# Patient Record
Sex: MALE | Race: WHITE | NOT HISPANIC OR LATINO | Employment: UNEMPLOYED | URBAN - METROPOLITAN AREA
[De-identification: names, ages, dates, MRNs, and addresses within clinical notes are randomized per-mention and may not be internally consistent; named-entity substitution may affect disease eponyms.]

---

## 2024-07-25 ENCOUNTER — HOSPITAL ENCOUNTER (INPATIENT)
Facility: CLINIC | Age: 69
LOS: 2 days | Discharge: HOME | DRG: 897 | End: 2024-07-27
Attending: FAMILY MEDICINE | Admitting: PSYCHIATRY & NEUROLOGY
Payer: COMMERCIAL

## 2024-07-25 ENCOUNTER — TELEPHONE (OUTPATIENT)
Dept: BEHAVIORAL HEALTH | Facility: CLINIC | Age: 69
End: 2024-07-25

## 2024-07-25 DIAGNOSIS — F10.239 ALCOHOL DEPENDENCE WITH WITHDRAWAL WITH COMPLICATION (H): ICD-10-CM

## 2024-07-25 LAB
ALBUMIN SERPL BCG-MCNC: 4.6 G/DL (ref 3.5–5.2)
ALBUMIN UR-MCNC: NEGATIVE MG/DL
ALP SERPL-CCNC: 84 U/L (ref 40–150)
ALT SERPL W P-5'-P-CCNC: 31 U/L (ref 0–70)
AMPHETAMINES UR QL SCN: NORMAL
ANION GAP SERPL CALCULATED.3IONS-SCNC: 13 MMOL/L (ref 7–15)
APPEARANCE UR: CLEAR
AST SERPL W P-5'-P-CCNC: 21 U/L (ref 0–45)
BARBITURATES UR QL SCN: NORMAL
BASOPHILS # BLD AUTO: 0 10E3/UL (ref 0–0.2)
BASOPHILS NFR BLD AUTO: 1 %
BENZODIAZ UR QL SCN: NORMAL
BILIRUB SERPL-MCNC: 0.5 MG/DL
BILIRUB UR QL STRIP: NEGATIVE
BUN SERPL-MCNC: 10 MG/DL (ref 8–23)
BZE UR QL SCN: NORMAL
CALCIUM SERPL-MCNC: 9.6 MG/DL (ref 8.8–10.4)
CANNABINOIDS UR QL SCN: NORMAL
CHLORIDE SERPL-SCNC: 94 MMOL/L (ref 98–107)
COLOR UR AUTO: NORMAL
CREAT SERPL-MCNC: 0.88 MG/DL (ref 0.67–1.17)
EGFRCR SERPLBLD CKD-EPI 2021: >90 ML/MIN/1.73M2
EOSINOPHIL # BLD AUTO: 0.4 10E3/UL (ref 0–0.7)
EOSINOPHIL NFR BLD AUTO: 5 %
ERYTHROCYTE [DISTWIDTH] IN BLOOD BY AUTOMATED COUNT: 12.2 % (ref 10–15)
ETHANOL SERPL-MCNC: 0.01 G/DL
FENTANYL UR QL: NORMAL
GLUCOSE SERPL-MCNC: 88 MG/DL (ref 70–99)
GLUCOSE UR STRIP-MCNC: NEGATIVE MG/DL
HCO3 SERPL-SCNC: 23 MMOL/L (ref 22–29)
HCT VFR BLD AUTO: 41.3 % (ref 40–53)
HGB BLD-MCNC: 14.8 G/DL (ref 13.3–17.7)
HGB UR QL STRIP: NEGATIVE
HOLD SPECIMEN: NORMAL
HOLD SPECIMEN: NORMAL
IMM GRANULOCYTES # BLD: 0.1 10E3/UL
IMM GRANULOCYTES NFR BLD: 1 %
KETONES UR STRIP-MCNC: NEGATIVE MG/DL
LEUKOCYTE ESTERASE UR QL STRIP: NEGATIVE
LIPASE SERPL-CCNC: 48 U/L (ref 13–60)
LYMPHOCYTES # BLD AUTO: 2.6 10E3/UL (ref 0.8–5.3)
LYMPHOCYTES NFR BLD AUTO: 30 %
MAGNESIUM SERPL-MCNC: 2 MG/DL (ref 1.7–2.3)
MCH RBC QN AUTO: 32.4 PG (ref 26.5–33)
MCHC RBC AUTO-ENTMCNC: 35.8 G/DL (ref 31.5–36.5)
MCV RBC AUTO: 90 FL (ref 78–100)
MONOCYTES # BLD AUTO: 0.8 10E3/UL (ref 0–1.3)
MONOCYTES NFR BLD AUTO: 9 %
NEUTROPHILS # BLD AUTO: 4.8 10E3/UL (ref 1.6–8.3)
NEUTROPHILS NFR BLD AUTO: 54 %
NITRATE UR QL: NEGATIVE
NRBC # BLD AUTO: 0 10E3/UL
NRBC BLD AUTO-RTO: 0 /100
OPIATES UR QL SCN: NORMAL
PCP QUAL URINE (ROCHE): NORMAL
PH UR STRIP: 6 [PH] (ref 5–7)
PLATELET # BLD AUTO: 209 10E3/UL (ref 150–450)
POTASSIUM SERPL-SCNC: 4.2 MMOL/L (ref 3.4–5.3)
PROT SERPL-MCNC: 7.2 G/DL (ref 6.4–8.3)
RBC # BLD AUTO: 4.57 10E6/UL (ref 4.4–5.9)
RBC URINE: 0 /HPF
SODIUM SERPL-SCNC: 130 MMOL/L (ref 135–145)
SP GR UR STRIP: 1 (ref 1–1.03)
UROBILINOGEN UR STRIP-MCNC: NORMAL MG/DL
WBC # BLD AUTO: 8.7 10E3/UL (ref 4–11)
WBC URINE: 0 /HPF

## 2024-07-25 PROCEDURE — 250N000013 HC RX MED GY IP 250 OP 250 PS 637: Performed by: CLINICAL NURSE SPECIALIST

## 2024-07-25 PROCEDURE — 83036 HEMOGLOBIN GLYCOSYLATED A1C: CPT | Performed by: PSYCHIATRY & NEUROLOGY

## 2024-07-25 PROCEDURE — 128N000004 HC R&B CD ADULT

## 2024-07-25 PROCEDURE — 36415 COLL VENOUS BLD VENIPUNCTURE: CPT | Performed by: FAMILY MEDICINE

## 2024-07-25 PROCEDURE — 84443 ASSAY THYROID STIM HORMONE: CPT | Performed by: PSYCHIATRY & NEUROLOGY

## 2024-07-25 PROCEDURE — 82977 ASSAY OF GGT: CPT | Performed by: PSYCHIATRY & NEUROLOGY

## 2024-07-25 PROCEDURE — 85025 COMPLETE CBC W/AUTO DIFF WBC: CPT | Performed by: FAMILY MEDICINE

## 2024-07-25 PROCEDURE — 99285 EMERGENCY DEPT VISIT HI MDM: CPT | Performed by: FAMILY MEDICINE

## 2024-07-25 PROCEDURE — HZ2ZZZZ DETOXIFICATION SERVICES FOR SUBSTANCE ABUSE TREATMENT: ICD-10-PCS | Performed by: PSYCHIATRY & NEUROLOGY

## 2024-07-25 PROCEDURE — 80307 DRUG TEST PRSMV CHEM ANLYZR: CPT | Performed by: FAMILY MEDICINE

## 2024-07-25 PROCEDURE — 80053 COMPREHEN METABOLIC PANEL: CPT | Performed by: FAMILY MEDICINE

## 2024-07-25 PROCEDURE — 82077 ASSAY SPEC XCP UR&BREATH IA: CPT | Performed by: FAMILY MEDICINE

## 2024-07-25 PROCEDURE — 81001 URINALYSIS AUTO W/SCOPE: CPT | Performed by: FAMILY MEDICINE

## 2024-07-25 PROCEDURE — 83735 ASSAY OF MAGNESIUM: CPT | Performed by: FAMILY MEDICINE

## 2024-07-25 PROCEDURE — 83690 ASSAY OF LIPASE: CPT | Performed by: FAMILY MEDICINE

## 2024-07-25 PROCEDURE — 258N000003 HC RX IP 258 OP 636: Performed by: FAMILY MEDICINE

## 2024-07-25 PROCEDURE — 250N000013 HC RX MED GY IP 250 OP 250 PS 637: Performed by: FAMILY MEDICINE

## 2024-07-25 RX ORDER — DIAZEPAM 5 MG
5-20 TABLET ORAL EVERY 30 MIN PRN
Status: DISCONTINUED | OUTPATIENT
Start: 2024-07-25 | End: 2024-07-25

## 2024-07-25 RX ORDER — LOPERAMIDE HCL 2 MG
2 CAPSULE ORAL 4 TIMES DAILY PRN
Status: DISCONTINUED | OUTPATIENT
Start: 2024-07-25 | End: 2024-07-27 | Stop reason: HOSPADM

## 2024-07-25 RX ORDER — POLYETHYLENE GLYCOL 3350 17 G/17G
17 POWDER, FOR SOLUTION ORAL DAILY PRN
Status: DISCONTINUED | OUTPATIENT
Start: 2024-07-25 | End: 2024-07-27 | Stop reason: HOSPADM

## 2024-07-25 RX ORDER — ONDANSETRON 4 MG/1
4 TABLET, ORALLY DISINTEGRATING ORAL EVERY 6 HOURS PRN
Status: DISCONTINUED | OUTPATIENT
Start: 2024-07-25 | End: 2024-07-27 | Stop reason: HOSPADM

## 2024-07-25 RX ORDER — ATORVASTATIN CALCIUM 10 MG/1
10 TABLET, FILM COATED ORAL DAILY
COMMUNITY
Start: 2024-04-19

## 2024-07-25 RX ORDER — GABAPENTIN 100 MG/1
100 CAPSULE ORAL EVERY 6 HOURS PRN
Status: DISCONTINUED | OUTPATIENT
Start: 2024-07-25 | End: 2024-07-27 | Stop reason: HOSPADM

## 2024-07-25 RX ORDER — ACETAMINOPHEN 500 MG
1000 TABLET ORAL EVERY 6 HOURS PRN
COMMUNITY

## 2024-07-25 RX ORDER — MAGNESIUM HYDROXIDE/ALUMINUM HYDROXICE/SIMETHICONE 120; 1200; 1200 MG/30ML; MG/30ML; MG/30ML
30 SUSPENSION ORAL EVERY 4 HOURS PRN
Status: DISCONTINUED | OUTPATIENT
Start: 2024-07-25 | End: 2024-07-27 | Stop reason: HOSPADM

## 2024-07-25 RX ORDER — ASPIRIN 81 MG/1
81 TABLET ORAL DAILY
COMMUNITY

## 2024-07-25 RX ORDER — ACETAMINOPHEN 325 MG/1
650 TABLET ORAL EVERY 4 HOURS PRN
Status: DISCONTINUED | OUTPATIENT
Start: 2024-07-25 | End: 2024-07-27 | Stop reason: HOSPADM

## 2024-07-25 RX ORDER — TRAZODONE HYDROCHLORIDE 50 MG/1
50 TABLET, FILM COATED ORAL
Status: DISCONTINUED | OUTPATIENT
Start: 2024-07-25 | End: 2024-07-27 | Stop reason: HOSPADM

## 2024-07-25 RX ORDER — ALBUTEROL SULFATE 90 UG/1
2 AEROSOL, METERED RESPIRATORY (INHALATION) EVERY 6 HOURS PRN
COMMUNITY

## 2024-07-25 RX ORDER — DIAZEPAM 5 MG
5-20 TABLET ORAL EVERY 30 MIN PRN
Status: DISCONTINUED | OUTPATIENT
Start: 2024-07-25 | End: 2024-07-27 | Stop reason: HOSPADM

## 2024-07-25 RX ORDER — AMLODIPINE BESYLATE 10 MG/1
1 TABLET ORAL DAILY
COMMUNITY
Start: 2024-04-19

## 2024-07-25 RX ADMIN — DIAZEPAM 10 MG: 5 TABLET ORAL at 21:48

## 2024-07-25 RX ADMIN — SODIUM CHLORIDE 1000 ML: 0.9 INJECTION, SOLUTION INTRAVENOUS at 19:12

## 2024-07-25 RX ADMIN — GABAPENTIN 100 MG: 100 CAPSULE ORAL at 21:48

## 2024-07-25 RX ADMIN — DIAZEPAM 10 MG: 5 TABLET ORAL at 17:00

## 2024-07-25 ASSESSMENT — COLUMBIA-SUICIDE SEVERITY RATING SCALE - C-SSRS
5. HAVE YOU STARTED TO WORK OUT OR WORKED OUT THE DETAILS OF HOW TO KILL YOURSELF? DO YOU INTEND TO CARRY OUT THIS PLAN?: NO
1. IN THE PAST MONTH, HAVE YOU WISHED YOU WERE DEAD OR WISHED YOU COULD GO TO SLEEP AND NOT WAKE UP?: NO
3. HAVE YOU BEEN THINKING ABOUT HOW YOU MIGHT KILL YOURSELF?: NO
4. HAVE YOU HAD THESE THOUGHTS AND HAD SOME INTENTION OF ACTING ON THEM?: NO
2. HAVE YOU ACTUALLY HAD ANY THOUGHTS OF KILLING YOURSELF IN THE PAST MONTH?: YES
6. HAVE YOU EVER DONE ANYTHING, STARTED TO DO ANYTHING, OR PREPARED TO DO ANYTHING TO END YOUR LIFE?: NO

## 2024-07-25 ASSESSMENT — ACTIVITIES OF DAILY LIVING (ADL)
ADLS_ACUITY_SCORE: 35
HYGIENE/GROOMING: INDEPENDENT
ADLS_ACUITY_SCORE: 30
ADLS_ACUITY_SCORE: 35
DRESS: INDEPENDENT
ADLS_ACUITY_SCORE: 30
ORAL_HYGIENE: INDEPENDENT
LAUNDRY: WITH SUPERVISION
ADLS_ACUITY_SCORE: 35

## 2024-07-25 NOTE — TELEPHONE ENCOUNTER
"S: [facility] , Provider Christianson calling at 6:06 PM  with clinical on a 68 year old/Male presenting for alcohol detox.     B: Pt presents for ETOH detox. 69 yo drinking 12-18 beers with whiskey for several months. Last sober a yr ago. No MH concerns . Wakes from \"dream\" but no DT's. Pt scored a 13 has had valium in ED and is doing well. Ambulating and wants detox.      Currently reports drinking 12-18 beers and whiskey a day for several months.    Patient reports last use was Yesterday.  Pt ROMÁN: 0.01  Pt  denies hx of DT  Pt  denies hx of seizures. Last seizure: N/A  Pt endorsing the following symptoms of withdrawal: Tremulous  MSSA Score: 13, protocol started pt given valium.    Pt denies acute mental health or medical concerns.   Pt denies other drug use: None Amount/frequency: N/A    Does Pt have a detox care plan in [facility]? No  Does pt present with specific needs, assistive devices, or exclusionary criteria? None  Is the patient ambulating, eating and drinking in the ED? Yes    A: Pt meets criteria to be presented for IP detox admission. Patient is voluntary    COVID Symptoms: No  If yes, COVID test required   Utox: Ordered, not yet collected  Magnesium: WNL  CMP: Abnormalities: See labs  CBC: WNL  HCG: N/A     R: Patient cleared and ready for behavioral bed placement: Yes    Pt is meeting criteria for presentation to 3A/CD    Does Patient need a Transfer Center request created? No, Pt is located within [facility] ED, [facility] ED, or [place] [facility]     6:12 PM  Intake paged provider Reny to review pt for 3A/CD/Charlene.  "

## 2024-07-25 NOTE — TELEPHONE ENCOUNTER
6:28 PM  Provider Reny has accepted pt for 3A/AGATHA/Charlene with a request to Dr. Alejandre to kindly order NaCl bolus for pt.       Intake has called Station 3A ANITHA Griffith and ED nurse Ha  to notify. Per Gladis, ED should kindly wait for 3A to call them for report. RN Ha is aware and has to start IV for pt's saline and will wait to hear from the unit. Author is happy that everyone is on the same page.    The pt. has been added to the queue, the admit board and all transfer items (Guarantor, indicia, bed planning,  Internal checklist) complete.

## 2024-07-25 NOTE — ED PROVIDER NOTES
"    [facility] EMERGENCY DEPARTMENT ([facility])  7/25/24  [place]      History     Chief Complaint   Patient presents with    Drug / Alcohol Assessment     Patient presents seeking detox from alcohol; last drink 1 hour prior to arrival. Patient is currently drinking 12-16 beers per day. Patient denies history of seizures. No street drugs. Patient smokes 1 pack per day.     HPI  Mario Garcia is a 68 year old male with a past medical history significant for alcohol use disorder who presents to the Emergency Department seeking detox.  Patient accompanied here by significant other who states patient has been drinking on a daily basis he admits to 12-16 beers a day plus whiskey he denies any history of seizures no history of DTs patient is a pack-a-day smoker.  He denies any other acute medical concerns no acute psychiatric concerns denying homicidal suicidal or self-injurious behaviors patient is interested in both detox and the lodging plus program.      Past Medical History  No past medical history on file.  No past surgical history on file.  acetaminophen (TYLENOL) 500 MG tablet  amLODIPine (NORVASC) 10 MG tablet  aspirin 81 MG EC tablet  atorvastatin (LIPITOR) 10 MG tablet  omeprazole (PRILOSEC) 20 MG DR capsule      Allergies   Allergen Reactions    Bee Venom      Family History  No family history on file.  Social History       Past medical history, past surgical history, medications, allergies, family history, and social history were reviewed with the patient. No additional pertinent items.   A complete review of systems was performed with pertinent positives and negatives noted in the HPI, and all other systems negative.    Physical Exam   BP: 135/76  Pulse: 57  Temp: 98.1  F (36.7  C)  Resp: 16  Height: 177.8 cm (5' 10\")  Weight: 94.8 kg (209 lb)  SpO2: 98 %  Physical Exam  Constitutional:       General: He is not in acute distress.     Appearance: Normal appearance. He is not toxic-appearing.   HENT: "      Head: Atraumatic.   Eyes:      General: No scleral icterus.     Conjunctiva/sclera: Conjunctivae normal.   Cardiovascular:      Rate and Rhythm: Normal rate.      Heart sounds: Normal heart sounds.   Pulmonary:      Effort: Pulmonary effort is normal. No respiratory distress.      Breath sounds: Normal breath sounds.   Abdominal:      Palpations: Abdomen is soft.      Tenderness: There is no abdominal tenderness.   Musculoskeletal:         General: No deformity.      Cervical back: Neck supple.   Skin:     General: Skin is warm.   Neurological:      General: No focal deficit present.      Mental Status: He is alert and oriented to person, place, and time.      Sensory: No sensory deficit.      Motor: No weakness.      Coordination: Coordination normal.   Psychiatric:         Mood and Affect: Mood is anxious.         Speech: Speech normal.         Behavior: Behavior is cooperative.         Thought Content: Thought content does not include homicidal or suicidal ideation.           ED Course, Procedures, & Data      Procedures     Results for orders placed or performed during the hospital encounter of 07/25/24   [place] Draw     Status: None    Narrative    The following orders were created for panel order [place] Draw.  Procedure                               Abnormality         Status                     ---------                               -----------         ------                     Extra Green Top (Lithium...[[id number]]                      Final result               Extra Purple Top Tube[[id number]]                            Final result                 Please view results for these tests on the individual orders.   UA with Microscopic     Status: Normal   Result Value Ref Range    Color Urine Straw Colorless, Straw, Light Yellow, Yellow    Appearance Urine Clear Clear    Glucose Urine Negative Negative mg/dL    Bilirubin Urine Negative Negative    Ketones Urine Negative Negative mg/dL    Specific  Gravity Urine 1.003 1.003 - 1.035    Blood Urine Negative Negative    pH Urine 6.0 5.0 - 7.0    Protein Albumin Urine Negative Negative mg/dL    Urobilinogen Urine Normal Normal, 2.0 mg/dL    Nitrite Urine Negative Negative    Leukocyte Esterase Urine Negative Negative    RBC Urine 0 <=2 /HPF    WBC Urine 0 <=5 /HPF   Extra Green Top (Lithium Heparin) Tube     Status: None   Result Value Ref Range    Hold Specimen JI    Extra Purple Top Tube     Status: None   Result Value Ref Range    Hold Specimen [facility] hospital    Comprehensive metabolic panel     Status: Abnormal   Result Value Ref Range    Sodium 130 (L) 135 - 145 mmol/L    Potassium 4.2 3.4 - 5.3 mmol/L    Carbon Dioxide (CO2) 23 22 - 29 mmol/L    Anion Gap 13 7 - 15 mmol/L    Urea Nitrogen 10.0 8.0 - 23.0 mg/dL    Creatinine 0.88 0.67 - 1.17 mg/dL    GFR Estimate >90 >60 mL/min/1.73m2    Calcium 9.6 8.8 - 10.4 mg/dL    Chloride 94 (L) 98 - 107 mmol/L    Glucose 88 70 - 99 mg/dL    Alkaline Phosphatase 84 40 - 150 U/L    AST 21 0 - 45 U/L    ALT 31 0 - 70 U/L    Protein Total 7.2 6.4 - 8.3 g/dL    Albumin 4.6 3.5 - 5.2 g/dL    Bilirubin Total 0.5 <=1.2 mg/dL   Lipase     Status: Normal   Result Value Ref Range    Lipase 48 13 - 60 U/L   Magnesium     Status: Normal   Result Value Ref Range    Magnesium 2.0 1.7 - 2.3 mg/dL   Ethyl Alcohol Level     Status: Normal   Result Value Ref Range    Alcohol ethyl 0.01 <=0.01 g/dL   CBC with platelets and differential     Status: None   Result Value Ref Range    WBC Count 8.7 4.0 - 11.0 10e3/uL    RBC Count 4.57 4.40 - 5.90 10e6/uL    Hemoglobin 14.8 13.3 - 17.7 g/dL    Hematocrit 41.3 40.0 - 53.0 %    MCV 90 78 - 100 fL    MCH 32.4 26.5 - 33.0 pg    MCHC 35.8 31.5 - 36.5 g/dL    RDW 12.2 10.0 - 15.0 %    Platelet Count 209 150 - 450 10e3/uL    % Neutrophils 54 %    % Lymphocytes 30 %    % Monocytes 9 %    % Eosinophils 5 %    % Basophils 1 %    % Immature Granulocytes 1 %    NRBCs per 100 WBC 0 <1 /100    Absolute Neutrophils 4.8  1.6 - 8.3 10e3/uL    Absolute Lymphocytes 2.6 0.8 - 5.3 10e3/uL    Absolute Monocytes 0.8 0.0 - 1.3 10e3/uL    Absolute Eosinophils 0.4 0.0 - 0.7 10e3/uL    Absolute Basophils 0.0 0.0 - 0.2 10e3/uL    Absolute Immature Granulocytes 0.1 <=0.4 10e3/uL    Absolute NRBCs 0.0 10e3/uL   CBC with platelets differential     Status: None    Narrative    The following orders were created for panel order CBC with platelets differential.  Procedure                               Abnormality         Status                     ---------                               -----------         ------                     CBC with platelets and d...[[id number]]                      Final result                 Please view results for these tests on the individual orders.     Medications   diazepam (VALIUM) tablet 5-20 mg (10 mg Oral $Given 7/25/24 1700)     Labs Ordered and Resulted from Time of ED Arrival to Time of ED Departure   COMPREHENSIVE METABOLIC PANEL - Abnormal       Result Value    Sodium 130 (*)     Potassium 4.2      Carbon Dioxide (CO2) 23      Anion Gap 13      Urea Nitrogen 10.0      Creatinine 0.88      GFR Estimate >90      Calcium 9.6      Chloride 94 (*)     Glucose 88      Alkaline Phosphatase 84      AST 21      ALT 31      Protein Total 7.2      Albumin 4.6      Bilirubin Total 0.5     ROUTINE UA WITH MICROSCOPIC - Normal    Color Urine Straw      Appearance Urine Clear      Glucose Urine Negative      Bilirubin Urine Negative      Ketones Urine Negative      Specific Gravity Urine 1.003      Blood Urine Negative      pH Urine 6.0      Protein Albumin Urine Negative      Urobilinogen Urine Normal      Nitrite Urine Negative      Leukocyte Esterase Urine Negative      RBC Urine 0      WBC Urine 0     LIPASE - Normal    Lipase 48     MAGNESIUM - Normal    Magnesium 2.0     ETHYL ALCOHOL LEVEL - Normal    Alcohol ethyl 0.01     CBC WITH PLATELETS AND DIFFERENTIAL    WBC Count 8.7      RBC Count 4.57      Hemoglobin 14.8       Hematocrit 41.3      MCV 90      MCH 32.4      MCHC 35.8      RDW 12.2      Platelet Count 209      % Neutrophils 54      % Lymphocytes 30      % Monocytes 9      % Eosinophils 5      % Basophils 1      % Immature Granulocytes 1      NRBCs per 100 WBC 0      Absolute Neutrophils 4.8      Absolute Lymphocytes 2.6      Absolute Monocytes 0.8      Absolute Eosinophils 0.4      Absolute Basophils 0.0      Absolute Immature Granulocytes 0.1      Absolute NRBCs 0.0       No orders to display          Critical care was not performed.     Medical Decision Making  The patient's presentation was of high complexity (a chronic illness severe exacerbation, progression, or side effect of treatment).    The patient's evaluation involved:  ordering and/or review of 3+ test(s) in this encounter (see separate area of note for details)    The patient's management necessitated high risk (a decision regarding hospitalization).    Assessment & Plan        I have reviewed the nursing notes. I have reviewed the findings, diagnosis, plan and need for follow up with the patient.    Patient with alcohol dependence and in withdrawal at this time seeking detox and treatment patient will be admitted to station 3A for medical detox.    Final diagnoses:   Alcohol dependence with withdrawal with complication (H)       Christiano Alejandre MD  [facility] EMERGENCY DEPARTMENT  7/25/2024        Christiano Alejandre MD  07/25/24 5162

## 2024-07-25 NOTE — ED TRIAGE NOTES
Patient presents seeking detox from alcohol; last drink 1 hour prior to arrival. Patient is currently drinking 12-16 beers per day. Patient denies history of seizures. No street drugs. Patient smokes 1 pack per day.     Triage Assessment (Adult)       Row Name 07/25/24 1440          Triage Assessment    Airway WDL WDL        Respiratory WDL    Respiratory WDL WDL        Skin Circulation/Temperature WDL    Skin Circulation/Temperature WDL WDL        Cardiac WDL    Cardiac WDL WDL        Peripheral/Neurovascular WDL    Peripheral Neurovascular WDL WDL        Cognitive/Neuro/Behavioral WDL    Cognitive/Neuro/Behavioral WDL WDL

## 2024-07-26 LAB
ANION GAP SERPL CALCULATED.3IONS-SCNC: 11 MMOL/L (ref 7–15)
BUN SERPL-MCNC: 9.5 MG/DL (ref 8–23)
CALCIUM SERPL-MCNC: 9.7 MG/DL (ref 8.8–10.4)
CHLORIDE SERPL-SCNC: 102 MMOL/L (ref 98–107)
CREAT SERPL-MCNC: 0.85 MG/DL (ref 0.67–1.17)
EGFRCR SERPLBLD CKD-EPI 2021: >90 ML/MIN/1.73M2
GGT SERPL-CCNC: 261 U/L (ref 8–61)
GLUCOSE SERPL-MCNC: 112 MG/DL (ref 70–99)
HBA1C MFR BLD: 5.2 %
HCO3 SERPL-SCNC: 23 MMOL/L (ref 22–29)
POTASSIUM SERPL-SCNC: 4.6 MMOL/L (ref 3.4–5.3)
SODIUM SERPL-SCNC: 136 MMOL/L (ref 135–145)
TSH SERPL DL<=0.005 MIU/L-ACNC: 2.4 UIU/ML (ref 0.3–4.2)

## 2024-07-26 PROCEDURE — 99222 1ST HOSP IP/OBS MODERATE 55: CPT | Mod: AI | Performed by: PSYCHIATRY & NEUROLOGY

## 2024-07-26 PROCEDURE — 250N000013 HC RX MED GY IP 250 OP 250 PS 637: Performed by: CLINICAL NURSE SPECIALIST

## 2024-07-26 PROCEDURE — 99222 1ST HOSP IP/OBS MODERATE 55: CPT | Performed by: PHYSICIAN ASSISTANT

## 2024-07-26 PROCEDURE — 250N000013 HC RX MED GY IP 250 OP 250 PS 637: Performed by: PSYCHIATRY & NEUROLOGY

## 2024-07-26 PROCEDURE — 80048 BASIC METABOLIC PNL TOTAL CA: CPT | Performed by: PSYCHIATRY & NEUROLOGY

## 2024-07-26 PROCEDURE — 36415 COLL VENOUS BLD VENIPUNCTURE: CPT | Performed by: PSYCHIATRY & NEUROLOGY

## 2024-07-26 PROCEDURE — 128N000004 HC R&B CD ADULT

## 2024-07-26 RX ORDER — ASPIRIN 81 MG/1
81 TABLET ORAL DAILY
Status: DISCONTINUED | OUTPATIENT
Start: 2024-07-26 | End: 2024-07-27 | Stop reason: HOSPADM

## 2024-07-26 RX ORDER — FLUTICASONE FUROATE AND VILANTEROL 100; 25 UG/1; UG/1
1 POWDER RESPIRATORY (INHALATION) DAILY
Status: CANCELLED | OUTPATIENT
Start: 2024-07-26

## 2024-07-26 RX ORDER — MULTIVITAMIN,THERAPEUTIC
1 TABLET ORAL DAILY
Status: DISCONTINUED | OUTPATIENT
Start: 2024-07-26 | End: 2024-07-27 | Stop reason: HOSPADM

## 2024-07-26 RX ORDER — ALBUTEROL SULFATE 90 UG/1
2 AEROSOL, METERED RESPIRATORY (INHALATION) EVERY 6 HOURS PRN
Status: DISCONTINUED | OUTPATIENT
Start: 2024-07-26 | End: 2024-07-27 | Stop reason: HOSPADM

## 2024-07-26 RX ORDER — ATORVASTATIN CALCIUM 10 MG/1
10 TABLET, FILM COATED ORAL DAILY
Status: DISCONTINUED | OUTPATIENT
Start: 2024-07-26 | End: 2024-07-27 | Stop reason: HOSPADM

## 2024-07-26 RX ORDER — AMLODIPINE BESYLATE 10 MG/1
10 TABLET ORAL DAILY
Status: DISCONTINUED | OUTPATIENT
Start: 2024-07-26 | End: 2024-07-27 | Stop reason: HOSPADM

## 2024-07-26 RX ORDER — FOLIC ACID 1 MG/1
1 TABLET ORAL DAILY
Status: DISCONTINUED | OUTPATIENT
Start: 2024-07-26 | End: 2024-07-27 | Stop reason: HOSPADM

## 2024-07-26 RX ADMIN — THERA TABS 1 TABLET: TAB at 08:17

## 2024-07-26 RX ADMIN — NICOTINE POLACRILEX 4 MG: 4 GUM, CHEWING BUCCAL at 10:22

## 2024-07-26 RX ADMIN — ATORVASTATIN CALCIUM 10 MG: 10 TABLET, FILM COATED ORAL at 08:16

## 2024-07-26 RX ADMIN — ASPIRIN 81 MG: 81 TABLET, COATED ORAL at 08:15

## 2024-07-26 RX ADMIN — OMEPRAZOLE 20 MG: 20 CAPSULE, DELAYED RELEASE ORAL at 08:15

## 2024-07-26 RX ADMIN — NICOTINE POLACRILEX 4 MG: 4 GUM, CHEWING BUCCAL at 16:16

## 2024-07-26 RX ADMIN — FOLIC ACID 1 MG: 1 TABLET ORAL at 08:15

## 2024-07-26 RX ADMIN — GABAPENTIN 100 MG: 100 CAPSULE ORAL at 10:22

## 2024-07-26 RX ADMIN — THIAMINE HCL TAB 100 MG 100 MG: 100 TAB at 08:15

## 2024-07-26 RX ADMIN — AMLODIPINE BESYLATE 10 MG: 10 TABLET ORAL at 08:15

## 2024-07-26 ASSESSMENT — ACTIVITIES OF DAILY LIVING (ADL)
HYGIENE/GROOMING: INDEPENDENT
ADLS_ACUITY_SCORE: 30
ORAL_HYGIENE: INDEPENDENT
ADLS_ACUITY_SCORE: 30
ORAL_HYGIENE: INDEPENDENT
ADLS_ACUITY_SCORE: 30
ADLS_ACUITY_SCORE: 30
DRESS: INDEPENDENT
ADLS_ACUITY_SCORE: 30
HYGIENE/GROOMING: INDEPENDENT
ADLS_ACUITY_SCORE: 30
ADLS_ACUITY_SCORE: 30
LAUNDRY: WITH SUPERVISION
ADLS_ACUITY_SCORE: 30
ADLS_ACUITY_SCORE: 30
DRESS: INDEPENDENT
ADLS_ACUITY_SCORE: 30

## 2024-07-26 NOTE — H&P
"Psychiatry History and Physical    Mario Garcia MRN# [id number]   Age: 68 year old YOB: 1955     Date of Admission:  7/25/2024          Assessment:   This patient is a 68 year old male with history of substance use who presented to ED seeking detox from alcohol. Medically cleared in ED, admitted to  as voluntary patient. Drinking up to 16 beers plus whiskey daily, EtOH ROMÁN 0.01, UDS negative, denies other substance outside of nicotine use daily. MSSA with diazepam started for alcohol withdrawal. Withdrawal precautions in place, denies hx of seizures or DTs. Admission labs ordered and reviewed those resulted. IM consult placed. Denying safety concerns including SI and HI. PTA medications continued as below. Pt reports goals for hospitalization are to complete detox and engage in CD treatment, unsure of outpatient vs residential, is open to reviewing information on MAT, has taken antabuse in the past effectively but not sure if wanting to resume. .      Inpatient psychiatric hospitalization is warranted at this time for safety, stabilization, and possible adjustment in medications.         Diagnoses:   Alcohol use disorder, severe, dependence with withdrawal with complication   Nicotine dependence with withdrawal   R/O adjustment disorder with depressed mood   Hyponatremia  HLD  HTN  GERD  CAD  NSVT hx  COPD     Clinically Significant Risk Factors Present on Admission                # Drug Induced Platelet Defect: home medication list includes an antiplatelet medication           # Overweight: Estimated body mass index is 29.99 kg/m  as calculated from the following:    Height as of this encounter: 1.778 m (5' 10\").    Weight as of this encounter: 94.8 kg (209 lb).                        Plan:   Psychiatric treatment/inteventions:  Medications:   -MSSA with diazepam, thiamine, folic acid, multivitamin and PRN atenolol for alcohol withdrawal   -PRN gabapentin 100mg every 6 hours for anxiety   -PRN " trazodone 50mg at bedtime for sleep   -NRT ordered on unit and will offer on discharge and educate pt on benefits of cessation   -consider MAT for AUD prior to discharge, order placed for pt to review info   -pt open to CD treatment, outpatient vs residential, to discuss further with CTC       Laboratory/Imaging: EtOH ROMÁN 0.01; UDS negative; UA without evidence of infection; CBC WNL; Lipase WNL; CMP with Na 130(L), Cl 94(L) otherwise WNL; Mg WNL; GGT, TSH, HgbA1c ordered to complete admission labs, pt has had lipid panel in March and was WNL; repeat BMP ordered this AM given abnormalities in ED    Patient will be treated in therapeutic milieu with appropriate individual and group therapies as described.    Medical treatment/interventions:  Medical concerns:   1) Alcohol withdrawal  -MSSA as above  -PRN zofran and imodium for GI distress related to withdrawal   -withdrawal precautions    2) IM consult placed for management of other medical concerns, please see consult note when completed this date for complete details.       Legal Status: Voluntary    Safety Assessment:   Behavioral Orders   Procedures    Code 1 - Restrict to Unit    Routine Programming     As clinically indicated    Status 15     Every 15 minutes.    Withdrawal precautions      Pt has not required locked seclusion or restraints in the past 24 hours to maintain safety, please refer to RN documentation for further details.    The risks, benefits, alternatives and side effects have been discussed and are understood by the patient.    Disposition: Pending clinical stabilization. Will likely discharge to home with outpatient CD tx or CD tx when stable.    This note was created by a[employer]andria using a Dragon dictation system. All typing errors or contextual distortion are unintentional and software inherent.     Tarah Chang DO  [facility] Psychiatry         Chief Complaint:     Alcohol withdrawal          History of Present Illness:  "    Mario is a 68 year old male with history of substance use who presented to ED seeking detox from alcohol.    Chart review completed including ED notes from this encounter, recent PCP notes, recent cardiology notes, behavioral health notes from 2023 indicating hx of possible adjustment disorder.     Per ED physician note: Mario Garcia is a 68 year old male with a past medical history significant for alcohol use disorder who presents to the Emergency Department seeking detox.  Patient accompanied here by significant other who states patient has been drinking on a daily basis he admits to 12-16 beers a day plus whiskey he denies any history of seizures no history of DTs patient is a pack-a-day smoker.  He denies any other acute medical concerns no acute psychiatric concerns denying homicidal suicidal or self-injurious behaviors patient is interested in both detox and the lodging plus program.     Upon interview: Patient confirms information above, reports he for started drinking when he was 15, his use escalated throughout his teenage years to early adulthood, when he was 32 he did stop drinking and remained sober for 7 or 8 years, his use then started to escalate again later on in life when he built a cabin up [place] and spent more time up there and then really escalated with [facility].  He reports that he frequently gets \"bored\" and this leads to increased drinking.  He has been drinking 8-12 beers and several shots of whiskey per day recently, also smokes about a pack a day of cigarettes, denies other substance use outside of very occasional cannabis use.  He has tolerance to alcohol, drinking more than intended, difficulty cutting down, withdrawal symptoms when he stops drinking.  Denies history of seizures or DTs.  Denies any current hallucinations, has been experiencing some sweating, slight tremor, decreased appetite.  Denies any nausea.  Reports that part of his appetite is that he does not really have " "desire to eat or drive to eat due to ongoing alcohol use.  He does have a history of feeling depressed at times and anxious, reports that it \"is all the normal stuff with getting older\" denies having any thoughts of harming self or others.  Denies any history of suicide attempts or psychiatric admissions.  He is not taking medications for his mood, has taken Antabuse in the past to help with his sobriety which was effective for him but reports he is not sure he wants to resume this medication but open to considering alternatives.  He reports his goals for hospitalization are to complete detox and get set up with increased supports in his sobriety through either an outpatient program or possibly residential.              Psychiatric Review of Systems:   Depression:   Reports: low mood, apathy, decreased motivation, decreased interest   Denies:  suicidal ideation, changes in sleep, changes in appetite, guilt, hopelessness, helplessness, impaired concentration, decreased energy, irritability.  Ani:   Reports: none  Denies: sleeplessness, impulsiveness, racing thoughts, increased goal-directed activities, pressured speech, increase in energy  Psychosis:   Reports: none  Denies: visual hallucinations, auditory hallucinations, paranoia, grandiosity, ideas of reference, thought insertions, thought broadcasting.  Anxiety:   Reports: some anxiety with stressors   Denies: excessive worries that are difficult to control, panic attacks  PTSD:   Reports: hx of trauma, some intrusive memories   Denies: re-experiencing past trauma, nightmares, increased arousal, avoidance of traumatic stimuli, impaired function.  OCD:   Reports: none  Denies: obsessions, checking, symmetry, cleaning, skin picking.  ED:   Reports: none  Denies: restriction, binging, purging, excessive exercise, laxative abuse           Medical Review of Systems:     10 point review of systems is otherwise negative unless noted above.            Psychiatric " History:   Psychiatric Hospitalizations: denies  History of Psychosis: denies  Prior ECT: denies  Court Commitment: denies  Suicide Attempts: denies  Self-injurious Behavior: denies  Violence toward others: denies  Use of Psychotropics: has taken antabuse in the past          Substance Use History:   Alcohol: See HPI   Cannabis: very rare use, smoking   Nicotine: 1 ppd cigarettes   Cocaine: none  Methamphetamine: none  Opiates/Heroin: none  Benzodiazepines: none  Hallucinogens: none  Inhalants: none      Prior Chemical Dependency treatment: hx of previous outpatient tx         Social History:   Upbringing: born and raised in [place], large family   Relationships:single  Current Living Situation:lives alone in family home he grew up in [place], also has cabin   Occupational History: retired  Financial Support: limited, mentioned wanting to  some extra work to help with finances   Legal History:none reported  Abuse/Trauma History:his uncle  by suicide in front of him by [employer]          Family History:     H/o completed suicides in family: maternal uncle as above  Mental Health- depression on maternal side  CD- both sides of family   No family history on file.          Past Medical History:   No past medical history on file.    History of seizures: denies         Past Surgical History:   No past surgical history on file.           Allergies:      Allergies   Allergen Reactions    Bee Venom               Medications:   I have reviewed this patient's current medications  Medications Prior to Admission   Medication Sig Dispense Refill Last Dose    acetaminophen (TYLENOL) 500 MG tablet Take 1,000 mg by mouth every 6 hours as needed for pain   2024    albuterol (PROAIR HFA/PROVENTIL HFA/VENTOLIN HFA) 108 (90 Base) MCG/ACT inhaler Inhale 2 puffs into the lungs every 6 hours as needed for shortness of breath, wheezing or cough   Past Week    amLODIPine (NORVASC) 10 MG tablet Take 1 tablet by mouth daily    7/24/2024    aspirin 81 MG EC tablet Take 81 mg by mouth daily   7/24/2024    atorvastatin (LIPITOR) 10 MG tablet Take 10 mg by mouth daily   7/24/2024    omeprazole (PRILOSEC) 20 MG DR capsule Take 20 mg by mouth daily   7/25/2024             Labs:     Recent Results (from the past 24 hour(s))   Extra Green Top (Lithium Heparin) Tube    Collection Time: 07/25/24  3:52 PM   Result Value Ref Range    Hold Specimen [facility]    Extra Purple Top Tube    Collection Time: 07/25/24  3:52 PM   Result Value Ref Range    Hold Specimen [facility]    Comprehensive metabolic panel    Collection Time: 07/25/24  3:52 PM   Result Value Ref Range    Sodium 130 (L) 135 - 145 mmol/L    Potassium 4.2 3.4 - 5.3 mmol/L    Carbon Dioxide (CO2) 23 22 - 29 mmol/L    Anion Gap 13 7 - 15 mmol/L    Urea Nitrogen 10.0 8.0 - 23.0 mg/dL    Creatinine 0.88 0.67 - 1.17 mg/dL    GFR Estimate >90 >60 mL/min/1.73m2    Calcium 9.6 8.8 - 10.4 mg/dL    Chloride 94 (L) 98 - 107 mmol/L    Glucose 88 70 - 99 mg/dL    Alkaline Phosphatase 84 40 - 150 U/L    AST 21 0 - 45 U/L    ALT 31 0 - 70 U/L    Protein Total 7.2 6.4 - 8.3 g/dL    Albumin 4.6 3.5 - 5.2 g/dL    Bilirubin Total 0.5 <=1.2 mg/dL   Lipase    Collection Time: 07/25/24  3:52 PM   Result Value Ref Range    Lipase 48 13 - 60 U/L   Magnesium    Collection Time: 07/25/24  3:52 PM   Result Value Ref Range    Magnesium 2.0 1.7 - 2.3 mg/dL   Ethyl Alcohol Level    Collection Time: 07/25/24  3:52 PM   Result Value Ref Range    Alcohol ethyl 0.01 <=0.01 g/dL   CBC with platelets and differential    Collection Time: 07/25/24  3:52 PM   Result Value Ref Range    WBC Count 8.7 4.0 - 11.0 10e3/uL    RBC Count 4.57 4.40 - 5.90 10e6/uL    Hemoglobin 14.8 13.3 - 17.7 g/dL    Hematocrit 41.3 40.0 - 53.0 %    MCV 90 78 - 100 fL    MCH 32.4 26.5 - 33.0 pg    MCHC 35.8 31.5 - 36.5 g/dL    RDW 12.2 10.0 - 15.0 %    Platelet Count 209 150 - 450 10e3/uL    % Neutrophils 54 %    % Lymphocytes 30 %    % Monocytes 9 %    %  "Eosinophils 5 %    % Basophils 1 %    % Immature Granulocytes 1 %    NRBCs per 100 WBC 0 <1 /100    Absolute Neutrophils 4.8 1.6 - 8.3 10e3/uL    Absolute Lymphocytes 2.6 0.8 - 5.3 10e3/uL    Absolute Monocytes 0.8 0.0 - 1.3 10e3/uL    Absolute Eosinophils 0.4 0.0 - 0.7 10e3/uL    Absolute Basophils 0.0 0.0 - 0.2 10e3/uL    Absolute Immature Granulocytes 0.1 <=0.4 10e3/uL    Absolute NRBCs 0.0 10e3/uL   UA with Microscopic    Collection Time: 07/25/24  4:33 PM   Result Value Ref Range    Color Urine Straw Colorless, Straw, Light Yellow, Yellow    Appearance Urine Clear Clear    Glucose Urine Negative Negative mg/dL    Bilirubin Urine Negative Negative    Ketones Urine Negative Negative mg/dL    Specific Gravity Urine 1.003 1.003 - 1.035    Blood Urine Negative Negative    pH Urine 6.0 5.0 - 7.0    Protein Albumin Urine Negative Negative mg/dL    Urobilinogen Urine Normal Normal, 2.0 mg/dL    Nitrite Urine Negative Negative    Leukocyte Esterase Urine Negative Negative    RBC Urine 0 <=2 /HPF    WBC Urine 0 <=5 /HPF   Urine Drug Screen Panel    Collection Time: 07/25/24  4:33 PM   Result Value Ref Range    Amphetamines Urine Screen Negative Screen Negative    Barbituates Urine Screen Negative Screen Negative    Benzodiazepine Urine Screen Negative Screen Negative    Cannabinoids Urine Screen Negative Screen Negative    Cocaine Urine Screen Negative Screen Negative    Fentanyl Qual Urine Screen Negative Screen Negative    Opiates Urine Screen Negative Screen Negative    PCP Urine Screen Negative Screen Negative       /77 (BP Location: Left arm, Patient Position: Supine, Cuff Size: Adult Regular)   Pulse 56   Temp 97.7  F (36.5  C) (Oral)   Resp 16   Ht 1.778 m (5' 10\")   Wt 94.8 kg (209 lb)   SpO2 97%   BMI 29.99 kg/m    Weight is 209 lbs 0 oz  Body mass index is 29.99 kg/m .         Psychiatric Mental Status Examination:   Appearance: awake, alert, adequately groomed, appears recorded age  Attitude: " "cooperative and pleasant  Eye Contact: good  Mood: \"doing okay\" some periods of anxiety and depression   Affect: mood congruent and full range  Speech:  clear, coherent and normal prosody  Language: fluent in English  Psychomotor Behavior:  no evidence of tardive dyskinesia, dystonia, or tics  Gait/Station: normal  Thought Process:  linear, logical, goal oriented  Associations:  no loose associations  Thought Content:  Denying SI/HI/AVH; no evidence of psychotic thinking  Insight:  good  Judgement: intact  Oriented to:  time, person, and place  Attention Span and Concentration:  intact  Recent and Remote Memory:  intact  Fund of Knowledge: appropriate         Physical Exam:   Please refer to physical exam completed by ED provider, Christiano Alejandre MD, on 7/25/24 as below. I agree with the findings and assessment and have no additional findings to add at this time with exception that psychiatric findings now above in MSE.   Physical Exam  Constitutional:       General: He is not in acute distress.     Appearance: Normal appearance. He is not toxic-appearing.   HENT:      Head: Atraumatic.   Eyes:      General: No scleral icterus.     Conjunctiva/sclera: Conjunctivae normal.   Cardiovascular:      Rate and Rhythm: Normal rate.      Heart sounds: Normal heart sounds.   Pulmonary:      Effort: Pulmonary effort is normal. No respiratory distress.      Breath sounds: Normal breath sounds.   Abdominal:      Palpations: Abdomen is soft.      Tenderness: There is no abdominal tenderness.   Musculoskeletal:         General: No deformity.      Cervical back: Neck supple.   Skin:     General: Skin is warm.   Neurological:      General: No focal deficit present.      Mental Status: He is alert and oriented to person, place, and time.      Sensory: No sensory deficit.      Motor: No weakness.      Coordination: Coordination normal.   Psychiatric:         Mood and Affect: Mood is anxious.         Speech: Speech normal.         " Behavior: Behavior is cooperative.         Thought Content: Thought content does not include homicidal or suicidal ideation.

## 2024-07-26 NOTE — PLAN OF CARE
Goal Outcome Evaluation:    Plan of Care Reviewed With: patient      Problem: Adult Behavioral Health Plan of Care  Goal: Adheres to Safety Considerations for Self and Others  Outcome: Progressing  Intervention: Develop and Maintain Individualized Safety Plan  Recent Flowsheet Documentation  Taken 7/26/2024 1600 by Una Burgos RN  Safety Measures: safety rounds completed   Patient presents with bright pleasant affect. Visible and calm on the unit. MSSA score at 1600 was 1. Patient endorsed anxiety and depression of 2. He denied SI/HI and hallucinations. He attended group and occasionally seen in his room reading. No behavior concerns. MSSA 1 at 2000.

## 2024-07-26 NOTE — CONSULTS
Consulted to run a test claim for Advair.    Patient has pharmacy benefits through [employer]. Per insurance, the following are covered and preferred under the patient's plans:     Advair HFA - $47/mo  Fluticasone-Salmeterol Diskus - $47/mo  Advair Diskus - $47/mo       Please feel free to contact me with any other test claims, prior authorizations, or insurance questions regarding outpatient medications.     Thanks!      [facility]is [facility]  Discharge Pharmacy Liaison  [facility]  Pronouns: She/Her/Hers    Securely message with Everpix, Epic Secure Chat, or [facility]  Phone: [phone]  Fax: [phone]  [email or web address]

## 2024-07-26 NOTE — PROGRESS NOTES
07/25/24 2123   Patient Belongings   Did you bring any home meds/supplements to the hospital?  Yes   Disposition of meds  Sent to security/pharmacy per site process   Patient Belongings other (see comments)   Patient Belongings Put in Hospital Secure Location (Security or Locker, etc.) other (see comments)   Belongings Search Yes   Clothing Search Yes   Second Staff Storm

## 2024-07-26 NOTE — CONSULTS
[facility]  Internal Medicine Consult     Mario aGrcia MRN# [id number]   Age: 68 year old YOB: 1955     Date of Admission: 7/25/2024  Date of Consult: 7/26/2024    Primary Care Provider: No Ref-Primary, Physician    Requesting Service: Behavioral Health - Tarah Chang MD  Reason for Consult: General Medical Evaluation      SUBJECTIVE   CC:   Alcohol Detox   Assessment and Plan/Recommendations:     Mario Garcia is a 68 year old male with PMHx significant for alcohol use disorder who was admitted on 7/25/24 for alcohol detox. Pt was consuming 12-16 beers per day plus some whiskey. Internal medicine consulted for medical clearance.     # Alcohol withdrawal, hx of alcohol use disorder   MSSA 4 this shift. Pt reports drinking 12-16 beers per day. Blood EtOH on arrival was 0.01.   - Continue MSSA   - Folvite, multi-vites, thiamine supplementation   - Further management per Psychiatry     # Hyponatremia  - This could be related to alcohol use disorder. Pt states that his PO intake of water and food was intermittently poor prior to admission. Will continue to monitor at this time.   - Trend BMP daily to ensure that hyponatremia improves.   - If this does not improve can consider ordering a serum osmo to further investigate potential cause.  - addendum: this has resolved     # Hypertension  - Pt takes daily amlodipine 10 mg for treatment of hypertension. Notes that he did not receive his dose of amlodipine 1 day ago and thus had an elevated BP today. Did receive a dose of amlodipine today.   - Continue taking amlodipine 10 mg daily.     # COPD  - Hx of COPD that is not well controlled. Pt states that he uses his albuterol inhaler 3-4 times/day. Has not needed his inhaler since being inpatient for the last day, but would benefit from a maintenance inhaler as well as the rescue inhaler.   - Use albuterol inhaler PRN  - If covered by insurance, start a maintenance inhaler. If not,  "follow up closely outpatient with PCP for further management.     # Presumed CAD based on stress test 2017   Pt has been prescribed atorvastatin and aspirin 81 mg for this.   - Continue atorvastatin and aspirin 81 mg  This is managed by cardiology outpatient at [facility]      Currently, medically stable and internal medicine will sign off. Please contact if future questions or concerns arise. Thank you for the opportunity to be a part of this patient's care.       Bee MALLORY student      Internal Medicine [employer] Hospitalist  Page job code [id number] (), [id number] (3A), or [phone] ([facility] and )  Text paging via [employer] is appreciated  July 26, 2024         HPI:   Mario Garcia is a 68 year old male with PMHx significant for alcohol use disorder who was admitted on 7/25/24 for alcohol detox treatment. Pt was consuming 12-16 beers per day plus some whiskey. Internal medicine consulted for medical clearance.      Past Medical History:   No past medical history on file.     Reviewed and updated in [employer].     Past Surgical History:    No past surgical history on file.      Social History:         Family History:   No family history on file.      Allergies:     Allergies   Allergen Reactions    Bee Venom          Medications:   Reviewed. Please see MAR     Review of Systems:   10 point ROS of systems including Constitutional, Eyes, Respiratory, Cardiovascular, Gastroenterology, Genitourinary, Integumentary, Muscularskeletal, Psychiatric were all negative except for pertinent positives noted in my HPI.    OBJECTIVE   Physical Exam:   Vitals were reviewed  Blood pressure (!) 149/81, pulse 57, temperature 97.9  F (36.6  C), temperature source Oral, resp. rate 16, height 1.778 m (5' 10\"), weight 94.8 kg (209 lb), SpO2 99%.  General: Alert and oriented x3. Pleasant individual in NAD.  HEENT: Anicteric sclera  Cardiovascular: RRR, S1S2. No murmur noted  Lungs: CTAB without wheezing or crackles   Neurologic: No focal " "deficits, CN II-XII grossly intact  Skin: No jaundice, rashes, or lesions        Data:        Lab Results   Component Value Date     07/26/2024    Lab Results   Component Value Date    CHLORIDE 102 07/26/2024    Lab Results   Component Value Date    BUN 9.5 07/26/2024      Lab Results   Component Value Date    POTASSIUM 4.6 07/26/2024    Lab Results   Component Value Date    CO2 23 07/26/2024    Lab Results   Component Value Date    CR 0.85 07/26/2024        Lab Results   Component Value Date    WBC 8.7 07/25/2024    HGB 14.8 07/25/2024    HCT 41.3 07/25/2024    MCV 90 07/25/2024     07/25/2024     Lab Results   Component Value Date    WBC 8.7 07/25/2024        \"I was present with the APRN/PA student who participated in the service and in the documentation of the note. I have verified the history and personally performed the physical exam and medical decision making. I agree with the findings and plan of care as documented in the note.\"    Total time personally spent on encounter:  45 (if billing based on time)    Bennett Ramos, PA-C  Internal Medicine [facility] Hospitalist  Page job code [phone] (3B), [phone] (3A), or [phone] ([facility] and 4A)  Text paging via [employer] is appreciated  July 26, 2024      "

## 2024-07-26 NOTE — PLAN OF CARE
"  Problem: Alcohol Withdrawal  Goal: Alcohol Withdrawal Symptom Control  Outcome: Progressing   3AW Admission Note    S = Situation:   Maroi Garcia is a 68 year old year old male with a chief complaint of Drug / Alcohol Assessment (Patient presents seeking detox from alcohol; last drink 1 hour prior to arrival. Patient is currently drinking 12-16 beers per day. Patient denies history of seizures. No street drugs. Patient smokes 1 pack per day.)    Voluntary admission to 3A for alcohol withdrawal and detox.    B  = Background:   Substance use history: Alcohol and nicotine  Mental health history: Depression  Medical history:\" I might have had a heart attack\"  Legal history: Voluntary  Treatment history: One out patient and no inpatient treatment  Living situation: Live at home alone.  Recent life stressors: \" I drink just to norm myself from life's happenings\"     A  =  Assessment:   During admission interview, pt affect was flat blunted but engaging . Mood was calm and cooperative. Patient reports it is his first time in detox. Patient reports drinking 12-16 beers a day plus whiskey, he denies any history of seizures no history of DTs. Patient is a pack-a-day smoker.  He denies any other acute medical concerns no acute psychiatric concerns denying homicidal suicidal or self-injurious behaviors. He is interested in both detox and the lodging plus program.     MSSA 8 valium 10 mg given  /71   Pulse 64   Temp 98.1  F (36.7  C) (Oral)   Resp 16   Ht 1.778 m (5' 10\")   Wt 94.8 kg (209 lb)   SpO2 98%   BMI 29.99 kg/m       R =   Request or Recommendation:   Patient's alcohol withdrawal will be monitored and treated using MSSA with valium  Pt will meet with psychiatry, internal medicine, and case management tomorrow.  At the time of admission, pt reports discharge plan is to detox and follow up with treatment at [facility].  "

## 2024-07-26 NOTE — DISCHARGE INSTRUCTIONS
Behavioral Discharge Planning and Instructions  THANK YOU FOR CHOOSING [facility]  3A  [phone]    Summary: You were admitted to Station 3A on 7/25/24 for detoxification from alcohol.  A medical exam was performed that included lab work. You have met with a  and opted to attend AA and consider outpatient JOSE treatment.  Please take care and make your recovery a daily priority, Mario!  It was a pleasure working with you and the entire treatment team here wishes you the very best in your recovery!     Recommendation:  Attend AA meetings and consider outpatient JOSE treatment that accepts your Medicare if you are unable to abstain from using mood altering substances.   To access [place] Outpatient programs that accept Medicare, please reach out to our Adult JOSE IOP  Yomaira Soto, [facility] [phone]. Or you can email her at [email or web address]    Main Diagnoses:  Per Dr. Tarah Chang MD;  303.90 (F10.20) Alcohol Use Disorder Severe    Major Treatments, Procedures and Findings:  You were treated for alcohol detoxification using valium. You did not complete a chemical dependency assessment. You had labs drawn and those results were reviewed with you. Please take a copy of your lab work with you to your next primary care provider appointment.    Symptoms to Report:  If you experience more anxiety, confusion, sleeplessness, deep sadness or thoughts of suicide, notify your treatment team or notify your primary care provider. IF ANY OF THE SYMPTOMS YOU ARE EXPERIENCING ARE A MEDICAL EMERGENCY CALL 911 IMMEDIATELY.     Lifestyle Adjustment: Adjust your lifestyle to get enough sleep, relaxation, exercise and good nutrition. Continue to develop healthy coping skills to decrease stress and promote a sober living environment. Do not use mood altering substances including alcohol, illegal drugs or addictive medications other than what is currently prescribed.      Disposition: Home    Facts about COVID19 at www.cdc.gov/COVID19 and www.[place].gov/covid19    Keeping hands clean is one of the most important steps we can take to avoid getting sick and spreading germs to others.  Please wash your hands frequently and lather with soap for at least 20 seconds!    Outpatient JOSE:  To access [place] Outpatient Programs that accept Medicare, please reach out to our Adult JOSE IOP  Yomaira   Brittany, [facility] [phone]. Or you can email her at [email or web address]    Residential JOSE Treatment covered by Medicare [facility] [place] [place]  Call the [facility] 24/7  Substance Use Disorder Referral  Line at [phone].    Recovery apps for your phone for educational purposes and to locate in person and zoom recovery meetings  Everything AA - educational purpose and susan is a great resource  12 Step Toolkit - educational purpose learning about the 12 steps to recovery  [place] Cloud - meeting susan  AA  - meeting susan  Meeting guide - meeting susan  Quick NA meeting - meeting susan  David- has various apps    Resources:  Some AA/NA meetings are being held online however most have returned to in-person or a hybrid combination please check online to verify*  Need Support Now? If you or someone you know is struggling or in crisis, help is available. Call or text [employer] or chat RewardMyWay.[employer]  AA meetings search for them at: https://aa-intergroup.org (worldwide meeting listings)  AA meetings for [place] area can be found online at: https://a[place].org (click local online meetings listings)  NA meetings for [place] area can be found online at: https://www.nam[place].org  (click find a meeting)  AA and NA Sponsors are excellent resources for support and you can find one at any support group meeting.   Alcoholics Anonymous (https://aa.org/): for information 24 hours/day  AA Intergroup service office in [place] (http://www.aastpaul.org/)  "184.857.4179  AA Intergroup service office in [facility]: [phone]. (http://www.aamin[place].org/)  Narcotics Anonymous (www.nam[place].org) (821) 753-3369  https://aafairviewriverside.org/meetings  SMART Recovery - self management for addiction recovery:  www.smartrecovery.org  Pathways ~ A Health Crisis Resource & Support Center:  898.155.8965.  https://prescribetoprevent.org/patient-education/videos/  http://www.Poptentreduction.org  Crisis Text Line  Text 402963  You will be connected with a trained live crisis counselor to provide support. Por espanol, texto  TIMOTEO a 041879 o texto a 442-AYUDAME en WhatsApp  [place] Hope Line  1.671.SUICIDE [2160444]  [facility] [phone]  Support Group:  AA/NA and Sponsor/support.  Fast Tracker  Linking people to mental health and substance use disorder resources  paOnde.GutCheck   [place] Mental Health Warm Line  Peer to peer support  Monday thru Saturday, 12 pm to 10 pm  937.175.5220 or 1.102.753.6975  Text \"Support\" to 01965  National [place] on Mental Illness (CHRIS)  978.568.1123 or 1888.CHRIS.HELPS  Alcoholics Anonymous (www.alcoholics-anonymous.org): Check your phone book for your local chapter.  Suicide Awareness Voices of Education (SAVE) (www.save.org): 749-038-CORC (0834)  National Suicide Prevention Line (www.mentalhealthmn.org): 978-914-UFOD (0117)  Mental Health Consumer/Survivor Network of [place] (www.mhcsn.net): 188.719.8836 or 467-825-2091  Mental Health Association of [place] (www.mentalhealth.org): 435.565.9434 or 293-306-0589   Substance Abuse and Mental Health Services (www.samhsa.gov)  [place] Opioid Prevention Coalition: www.opioidcoalition.org    [place] Recovery Connection (MRC)  [facility] connects people seeking recovery to resources that help foster and sustain long-term recovery.  Whether you are seeking resources for treatment, transportation, housing, job training, education, health care or other pathways to " recovery, [facility] is a great place to start.  474.575.7757.  www.[facility].org    Great Pod casts for nutrition and wellness  Listen on Apple Podcasts  Dishing Up Nutrition   [employer]   Nutrition       Understand the connection between what you eat and how you feel. Hosted by licensed nutritionists and dietitians from [employer] we share practical, real-life solutions for healthier living through nutrition.     General Medication Instructions:   See your medication sheet(s) for instructions.   Take all medications as prescribed.  Make no changes unless your primary care provider suggests them.   Go to all your primary care provider visits.  Be sure to have all your required lab tests. This way, your medicines can be refilled on time.  Do not use any forms of alcohol.    Please Note:  If you have any questions at anytime after you are discharged please call [facility] detox unit 3AW at [phone].  [facility], Behavioral Intake [phone]  Medical Records call [phone]  Outpatient Behavioral Intake call [phone]  LP+ Wait List/Bed Availability call [phone]    Please remember to take all of your behavioral discharge planning and lab paperwork to any follow up appointments, it contains your lab results, diagnosis, medication list and discharge recommendations.      THANK YOU FOR CHOOSING [facility]

## 2024-07-26 NOTE — PHARMACY-ADMISSION MEDICATION HISTORY
Pharmacist Admission Medication History    Admission medication history is complete. The information provided in this note is only as accurate as the sources available at the time of the update.    Medication reconciliation/reorder completed by provider prior to medication history? Yes    Information Source(s): Patient via in-person    Pertinent Information: Discussed medication history with patient who was a good historian of medications and was familiar with names and doses. Unable to see dispense history because consent was not provided    Changes made to PTA medication list:  Added: Albuterol inhaler  Deleted: None  Changed: None    Allergies reviewed with patient and updates made in EHR: yes    Medication History Completed By: DAVID CASEY [facility] 7/25/2024 8:09 PM    Prior to Admission medications    Medication Sig Last Dose Taking? Auth Provider Long Term End Date   acetaminophen (TYLENOL) 500 MG tablet Take 1,000 mg by mouth every 6 hours as needed for pain 7/25/2024 Yes Reported, Patient     albuterol (PROAIR HFA/PROVENTIL HFA/VENTOLIN HFA) 108 (90 Base) MCG/ACT inhaler Inhale 2 puffs into the lungs every 6 hours as needed for shortness of breath, wheezing or cough Past Week Yes Unknown, Entered By History Yes    amLODIPine (NORVASC) 10 MG tablet Take 1 tablet by mouth daily 7/24/2024 Yes Reported, Patient Yes    aspirin 81 MG EC tablet Take 81 mg by mouth daily 7/24/2024 Yes Reported, Patient     atorvastatin (LIPITOR) 10 MG tablet Take 10 mg by mouth daily 7/24/2024 Yes Reported, Patient Yes    omeprazole (PRILOSEC) 20 MG DR capsule Take 20 mg by mouth daily 7/25/2024 Yes Reported, Patient

## 2024-07-26 NOTE — PLAN OF CARE
"Goal Outcome Evaluation:       Pt slept most of the night shift and 15 minutes checks completed per protocol. Pt scored 3 and 2 for MSSA and vital sign stable.   /77 (BP Location: Left arm, Patient Position: Supine, Cuff Size: Adult Regular)   Pulse 56   Temp 97.7  F (36.5  C) (Oral)   Resp 16   Ht 1.778 m (5' 10\")   Wt 94.8 kg (209 lb)   SpO2 97%   BMI 29.99 kg/m                   "

## 2024-07-26 NOTE — CONSULTS
Behavioral Team Discussion: (7/26/2024)    Continued Stay Criteria/Rationale: Patient admitted for Chemical Use Issues.  Plan: The following services will be provided to the patient; psychiatric assessment, medication management, therapeutic milieu, individual and group support, and skills groups.   Participants: 3A Provider: Dr. Tarah Chang MD; 3A RN:  Tre Rice RN; 3A CM's:  ROBERT Dee .  Summary/Recommendation: Providers will assess today for treatment recommendations, discharge planning, and aftercare plans. CM will meet with pt for discharge planning.   Medical/Physical: No new medical concerns reported.   Precautions:   Behavioral Orders   Procedures    Code 1 - Restrict to Unit    Routine Programming     As clinically indicated    Status 15     Every 15 minutes.    Withdrawal precautions     Rationale for change in precautions or plan: N/A  Progress: Initial.    ASAM Dimension Scale Ratings:  Dimension 1: 1 Client can tolerate and cope with withdrawal discomfort. The client displays mild to moderate intoxication or signs and symptoms interfering with daily functioning but does not immediately endanger self or others. Client poses minimal risk of severe withdrawal.  Dimension 2: 1 Client tolerates and lyndsey with physical discomfort and is able to get the services that the client needs.  Dimension 3: 2 Client has difficulty with impulse control and lacks coping skills. Client has thoughts of suicide or harm to others without means; however, the thoughts may interfere with participation in some treatment activities. Client has difficulty functioning in significant life areas. Client has moderate symptoms of emotional, behavioral, or cognitive problems. Client is able to participate in most treatment activities.  Dimension 4: 2 Client displays verbal compliance, but lacks consistent behaviors; has low motivation for change; and is passively involved in treatment.  Dimension 5: 4 No awareness of  the negative impact of mental health problems or substance abuse. No coping skills to arrest mental health or addiction illnesses, or prevent relapse.  Dimension 6: 4 Client has (A) Chronically antagonistic significant other, living environment, family, peer group or long-term criminal justice involvement that is harmful to recovery or treatment progress, or (B) Client has an actively antagonistic significant other, family, work, or living environment with immediate threat to the client's safety and well-being.

## 2024-07-26 NOTE — PROGRESS NOTES
07/25/24 212   Patient Belongings   Did you bring any home meds/supplements to the hospital?  Yes   Disposition of meds  Sent to security/pharmacy per site process   Patient Belongings other (see comments)   Patient Belongings Put in Hospital Secure Location (Security or Locker, etc.) other (see comments)   Belongings Search Yes   Clothing Search Yes   Second Staff [place]     Storage bin: backpack, shoes, shorts,  hat, Ziploc with cigs,keys; purple small bag (had meds),     Box in med room: phone, wallet    Security env  #  [id number]: $60.00 cash,  mastercard Security env #  [id number]: meds    A             ADMISSION:    I am responsible for any personal items that are not sent to the safe or pharmacy. [facility] is not responsible for loss, theft or damage of any property in my possession.    Patient Signature _________________________________________ Date/Time _____________________    Staff Signature ___________________________________________ Date/Time _____________________    2nd Staff person, if patient is unable/unwilling to sign    ________________________________________________________ Date/Time _____________________    DISCHARGE:    All personal items have been returned to me.    Patient Signature _________________________________________ Date/Time _____________________    Staff Signature ___________________________________________ Date/Time _____________________

## 2024-07-26 NOTE — PROGRESS NOTES
[facility]     Case Management Encounter: Writer met with patient to discuss aftercare and discharge plans. Patient stated he would like to attend [facility] - but is unable to due to his Medicare. Patient and writer discussed JOSE options and barriers with his Medicare - patient plans to return to AA program and agreed to consider IOP level of care using options in AVS if he feels he needs more structure in his day.    Insurance: MEDICARE - [employer] behavioral [employer] medicare replacement plan      Legal Status: vol     SUDs Assessment Status: does not need     ROIs on file: None     Living Situation: Patient lives alone in [facility]     Current Providers, Supports & Collateral:  Sober peers, friends    Current Plan/Referral Status: AA, working on therapy referrals with his primary care physician. IOP and residential (Medicare covered) program resources in AVS.     AVS complete    RN updated.    Leonor Arriaza  [facility] - Adult Inpatient Addiction Psychiatry Unit

## 2024-07-26 NOTE — PLAN OF CARE
Problem: Alcohol Withdrawal  Goal: Alcohol Withdrawal Symptom Control  Outcome: Progressing   Goal Outcome Evaluation:    Plan of Care Reviewed With: patient        Patient awake for most of the day.  He has spent some time in his room reading and napping.  He hasn't required medications for withdrawal this shift--was given some gabapentin for anxiety with relief X 1.  Pt is alert and oriented X 4, he denies SI, SIB.  His affect is blunted, he is calm and cooperative.  Pt's gait is steady, his speech is clear and his thoughts follow.  Pt has decided to return home after detox and resume going to AA in his neighborhood--he has been involved with this recovering community for a long time.  Will continue to monitor withdrawal and assist if discharge plans/request changes

## 2024-07-27 VITALS
HEIGHT: 70 IN | OXYGEN SATURATION: 97 % | WEIGHT: 209 LBS | DIASTOLIC BLOOD PRESSURE: 77 MMHG | HEART RATE: 61 BPM | BODY MASS INDEX: 29.92 KG/M2 | RESPIRATION RATE: 16 BRPM | SYSTOLIC BLOOD PRESSURE: 114 MMHG | TEMPERATURE: 97.1 F

## 2024-07-27 PROCEDURE — 250N000013 HC RX MED GY IP 250 OP 250 PS 637: Performed by: PSYCHIATRY & NEUROLOGY

## 2024-07-27 PROCEDURE — 250N000013 HC RX MED GY IP 250 OP 250 PS 637: Performed by: CLINICAL NURSE SPECIALIST

## 2024-07-27 PROCEDURE — 99238 HOSP IP/OBS DSCHRG MGMT 30/<: CPT | Performed by: PSYCHIATRY & NEUROLOGY

## 2024-07-27 RX ADMIN — THERA TABS 1 TABLET: TAB at 08:11

## 2024-07-27 RX ADMIN — FOLIC ACID 1 MG: 1 TABLET ORAL at 08:10

## 2024-07-27 RX ADMIN — ASPIRIN 81 MG: 81 TABLET, COATED ORAL at 08:10

## 2024-07-27 RX ADMIN — AMLODIPINE BESYLATE 10 MG: 10 TABLET ORAL at 08:10

## 2024-07-27 RX ADMIN — TRAZODONE HYDROCHLORIDE 50 MG: 50 TABLET ORAL at 00:13

## 2024-07-27 RX ADMIN — NICOTINE POLACRILEX 4 MG: 4 GUM, CHEWING BUCCAL at 09:17

## 2024-07-27 RX ADMIN — THIAMINE HCL TAB 100 MG 100 MG: 100 TAB at 08:10

## 2024-07-27 RX ADMIN — OMEPRAZOLE 20 MG: 20 CAPSULE, DELAYED RELEASE ORAL at 08:09

## 2024-07-27 RX ADMIN — ATORVASTATIN CALCIUM 10 MG: 10 TABLET, FILM COATED ORAL at 08:10

## 2024-07-27 ASSESSMENT — ACTIVITIES OF DAILY LIVING (ADL)
DRESS: INDEPENDENT
ADLS_ACUITY_SCORE: 30
ORAL_HYGIENE: INDEPENDENT
ADLS_ACUITY_SCORE: 30
HYGIENE/GROOMING: INDEPENDENT
ADLS_ACUITY_SCORE: 30

## 2024-07-27 NOTE — PLAN OF CARE
Problem: Adult Behavioral Health Plan of Care  Goal: Plan of Care Review  Recent Flowsheet Documentation  Taken 7/27/2024 0800 by Tre Rice RN  Patient Agreement with Plan of Care: agrees   Goal Outcome Evaluation:    Plan of Care Reviewed With: patient        Patient is alert and oriented X 4 with no SI or SIB.  He is eating well and drinking adequate fluids.  Patient's affect is full range, he is cooperative and help seeking.  Patient's gait is steady, his speech is clear and his thoughts follow.  Patient is out of detox status.  Patient has decided to return home and attend AA.  Resources were provided if he chooses to seek a higher level of care.  All medications, labs and discharge instructions were reviewed with patient.  Discharge to home.
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
## 2024-07-27 NOTE — PLAN OF CARE
Problem: Alcohol Withdrawal  Goal: Alcohol Withdrawal Symptom Control  Outcome: Progressing     Problem: Sleep Disturbance  Goal: Adequate Sleep/Rest  Outcome: Progressing   Goal Outcome Evaluation:             The patient slept through the night. Breathing was quiet and unlabored.He had been out of detox. No safety or behavioral concerns were reported or observed. Staff will continue to monitor.